# Patient Record
Sex: MALE | Race: WHITE
[De-identification: names, ages, dates, MRNs, and addresses within clinical notes are randomized per-mention and may not be internally consistent; named-entity substitution may affect disease eponyms.]

---

## 2018-02-02 ENCOUNTER — HOSPITAL ENCOUNTER (EMERGENCY)
Dept: HOSPITAL 17 - PHED | Age: 33
Discharge: HOME | End: 2018-02-02
Payer: SELF-PAY

## 2018-02-02 VITALS
HEART RATE: 86 BPM | SYSTOLIC BLOOD PRESSURE: 148 MMHG | DIASTOLIC BLOOD PRESSURE: 77 MMHG | RESPIRATION RATE: 14 BRPM | OXYGEN SATURATION: 96 %

## 2018-02-02 VITALS
TEMPERATURE: 98.7 F | SYSTOLIC BLOOD PRESSURE: 160 MMHG | HEART RATE: 84 BPM | RESPIRATION RATE: 16 BRPM | OXYGEN SATURATION: 98 % | DIASTOLIC BLOOD PRESSURE: 99 MMHG

## 2018-02-02 VITALS — RESPIRATION RATE: 14 BRPM

## 2018-02-02 VITALS
HEART RATE: 91 BPM | DIASTOLIC BLOOD PRESSURE: 79 MMHG | OXYGEN SATURATION: 100 % | RESPIRATION RATE: 20 BRPM | SYSTOLIC BLOOD PRESSURE: 123 MMHG

## 2018-02-02 VITALS — BODY MASS INDEX: 28.02 KG/M2 | WEIGHT: 189.16 LBS | HEIGHT: 69 IN

## 2018-02-02 DIAGNOSIS — M54.6: Primary | ICD-10-CM

## 2018-02-02 DIAGNOSIS — Z72.0: ICD-10-CM

## 2018-02-02 DIAGNOSIS — R10.12: ICD-10-CM

## 2018-02-02 LAB
ALBUMIN SERPL-MCNC: 4.2 GM/DL (ref 3.4–5)
ALP SERPL-CCNC: 87 U/L (ref 45–117)
ALT SERPL-CCNC: 58 U/L (ref 12–78)
AST SERPL-CCNC: 37 U/L (ref 15–37)
BASOPHILS # BLD AUTO: 0.4 TH/MM3 (ref 0–0.2)
BASOPHILS NFR BLD: 3.2 % (ref 0–2)
BILIRUB SERPL-MCNC: 0.3 MG/DL (ref 0.2–1)
BUN SERPL-MCNC: 12 MG/DL (ref 7–18)
CALCIUM SERPL-MCNC: 9.2 MG/DL (ref 8.5–10.1)
CHLORIDE SERPL-SCNC: 109 MEQ/L (ref 98–107)
CREAT SERPL-MCNC: 0.89 MG/DL (ref 0.6–1.3)
EOSINOPHIL # BLD: 0.1 TH/MM3 (ref 0–0.4)
EOSINOPHIL NFR BLD: 1.3 % (ref 0–4)
ERYTHROCYTE [DISTWIDTH] IN BLOOD BY AUTOMATED COUNT: 12.8 % (ref 11.6–17.2)
GFR SERPLBLD BASED ON 1.73 SQ M-ARVRAT: 98 ML/MIN (ref 89–?)
GLUCOSE SERPL-MCNC: 81 MG/DL (ref 74–106)
HCO3 BLD-SCNC: 24.7 MEQ/L (ref 21–32)
HCT VFR BLD CALC: 46.3 % (ref 39–51)
HGB BLD-MCNC: 15.9 GM/DL (ref 13–17)
LYMPHOCYTES # BLD AUTO: 2.9 TH/MM3 (ref 1–4.8)
LYMPHOCYTES NFR BLD AUTO: 25.5 % (ref 9–44)
MCH RBC QN AUTO: 31.4 PG (ref 27–34)
MCHC RBC AUTO-ENTMCNC: 34.4 % (ref 32–36)
MCV RBC AUTO: 91.2 FL (ref 80–100)
MONOCYTE #: 0.7 TH/MM3 (ref 0–0.9)
MONOCYTES NFR BLD: 6 % (ref 0–8)
NEUTROPHILS # BLD AUTO: 7.1 TH/MM3 (ref 1.8–7.7)
NEUTROPHILS NFR BLD AUTO: 64 % (ref 16–70)
PLATELET # BLD: 250 TH/MM3 (ref 150–450)
PMV BLD AUTO: 9.9 FL (ref 7–11)
PROT SERPL-MCNC: 7.9 GM/DL (ref 6.4–8.2)
RBC # BLD AUTO: 5.08 MIL/MM3 (ref 4.5–5.9)
SODIUM SERPL-SCNC: 140 MEQ/L (ref 136–145)
WBC # BLD AUTO: 11.2 TH/MM3 (ref 4–11)

## 2018-02-02 PROCEDURE — 96374 THER/PROPH/DIAG INJ IV PUSH: CPT

## 2018-02-02 PROCEDURE — 99284 EMERGENCY DEPT VISIT MOD MDM: CPT

## 2018-02-02 PROCEDURE — 83690 ASSAY OF LIPASE: CPT

## 2018-02-02 PROCEDURE — 80053 COMPREHEN METABOLIC PANEL: CPT

## 2018-02-02 PROCEDURE — 96375 TX/PRO/DX INJ NEW DRUG ADDON: CPT

## 2018-02-02 PROCEDURE — 85025 COMPLETE CBC W/AUTO DIFF WBC: CPT

## 2018-02-02 PROCEDURE — 96376 TX/PRO/DX INJ SAME DRUG ADON: CPT

## 2018-02-02 PROCEDURE — 72128 CT CHEST SPINE W/O DYE: CPT

## 2018-02-02 NOTE — RADRPT
EXAM DATE/TIME:  02/02/2018 19:45 

 

[facility] COMPARISON:     

No previous studies available for comparison.

 

 

INDICATIONS :     

Trauma.  Mid back pain. 

                      

 

RADIATION DOSE:     

43.41 CTDIvol (mGy) 

 

 

 

MEDICAL HISTORY :     

None  

 

SURGICAL HISTORY :      

None. 

 

ENCOUNTER:      

Initial

 

ACUITY:      

1 day

 

PAIN SCALE:      

10/10

 

LOCATION:         

Thoracic spine.

 

TECHNIQUE:     

Volumetric scanning of the thoracic spine was performed.  Multiplanar reconstructions in the sagittal
, coronal and oblique axial planes were performed.  Using automated exposure control and adjustment o
f the mA and/or kV according to patient size, radiation dose was kept as low as reasonably achievable
 to obtain optimal diagnostic quality images.   DICOM format image data is available electronically f
or review and comparison.  

 

FINDINGS:     

The vertebral bodies of the thoracic spine are in normal alignment without evidence of subluxation.  


Vertebral body height is maintained.  No fractures are seen. Mild degenerative changes are noted thro
ughout the thoracic spine. There is a tiny 2 mm calcified nonobstructing left renal calculus.

 

T1-T2:   

Normal.

 

T2-T3:   

The thecal sac has a normal diameter.  No evidence of disc bulge or protrusion.

 

T3-T4:   

The thecal sac has a normal diameter.  No evidence of disc bulge or protrusion.

 

T4-T5:   

The thecal sac has a normal diameter.  No evidence of disc bulge or protrusion.

 

T5-T6:   

The thecal sac has a normal diameter.  No evidence of disc bulge or protrusion.

 

T6-T7:   

The thecal sac has a normal diameter.  No evidence of disc bulge or protrusion.

 

T7-T8:   

The thecal sac has a normal diameter.  No evidence of disc bulge or protrusion.

 

T8-T9:   

The thecal sac has a normal diameter.  No evidence of disc bulge or protrusion.

 

T9-T10:  

The thecal sac has a normal diameter.  No evidence of disc bulge or protrusion.

 

T10-T11:  

The thecal sac has a normal diameter.  No evidence of disc bulge or protrusion.

 

T11-T12:  

The thecal sac has a normal diameter.  No evidence of disc bulge or protrusion.

 

T12-L1:  

The thecal sac has a normal diameter.  No evidence of disc bulge or protrusion.

 

CONCLUSION:     

1. No acute fracture or subluxation. 

2. Mild degenerative changes throughout the thoracic spine. 

3. Tiny 2 mm calcified nonobstructing left renal calculus. 

 

 

 Ariel Lovelace MD on February 02, 2018 at 20:12           

Board Certified Radiologist.

 This report was verified electronically.
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
## 2018-03-10 ENCOUNTER — HOSPITAL ENCOUNTER (EMERGENCY)
Dept: HOSPITAL 17 - PHEFT | Age: 33
Discharge: HOME | End: 2018-03-10
Payer: SELF-PAY

## 2018-03-10 VITALS — BODY MASS INDEX: 27.4 KG/M2 | WEIGHT: 184.97 LBS | HEIGHT: 69 IN

## 2018-03-10 VITALS
DIASTOLIC BLOOD PRESSURE: 74 MMHG | HEART RATE: 88 BPM | OXYGEN SATURATION: 96 % | RESPIRATION RATE: 16 BRPM | SYSTOLIC BLOOD PRESSURE: 149 MMHG | TEMPERATURE: 98.3 F

## 2018-03-10 DIAGNOSIS — Z72.0: ICD-10-CM

## 2018-03-10 DIAGNOSIS — L02.512: Primary | ICD-10-CM

## 2018-03-10 PROCEDURE — 96372 THER/PROPH/DIAG INJ SC/IM: CPT

## 2018-03-10 PROCEDURE — 87205 SMEAR GRAM STAIN: CPT

## 2018-03-10 PROCEDURE — 87070 CULTURE OTHR SPECIMN AEROBIC: CPT

## 2018-03-10 PROCEDURE — 10061 I&D ABSCESS COMP/MULTIPLE: CPT

## 2018-03-10 NOTE — PD
HPI


Chief Complaint:  Skin Problem


Time Seen by Provider:  11:49


Travel History


International Travel<30 days:  No


Contact w/Intl Traveler<30days:  No


Traveled to known affect area:  No





History of Present Illness


HPI


32-year-old male that presents to the ED for evaluation of possible infection 

to his left index finger.  Per patient she's had this for about a week and 

half.  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 

believes that he injured its.  Per patient he believes that he cut it with a 

piece of metal.  Per patient he was reddened pimple-like yesterday and he 

himself expressed the pus.  Per patient he was able to get some pulsatile but 

today became more severe and more painful.  Patient is concerned that there is 

an infection in there.  Per patient he is up-to-date with his tetanus.  No 

other medical issues.  No foreign body sensation.  Able to move it fully but 

has pain with flexion.  All the pain and swelling since to the dorsal aspect of 

the PIP area of the left index finger.  Some soft tissue swelling noted.  

Erythema noted.  Per patient the pain is 8 out of 10 especially with movement 

and touch.





PFSH


Past Medical History


Hx Anticoagulant Therapy:  No


Diabetes:  No





Past Surgical History


Abdominal Surgery:  Yes (HERNIA)





Social History


Alcohol Use:  Yes


Tobacco Use:  Yes


Substance Use:  No





Allergies-Medications


(Allergen,Severity, Reaction):  


Coded Allergies:  


     No Known Allergies (Unverified , 3/10/18)


Reported Meds & Prescriptions





Reported Meds & Active Scripts


Active


Bactrim DS (Sulfamethoxazole-Trimethoprim) 800-160 Mg Tab 1 Tab PO BID 10 Days


Clindamycin (Clindamycin HCl) 150 Mg Cap 300 Mg PO Q8HR 10 Days


Diclofenac Sodium DR (Diclofenac Sodium) 75 Mg Tabdr 75 Mg PO BID PRN








Review of Systems


Except as stated in HPI:  all other systems reviewed are Neg





Physical Exam


Narrative


GENERAL: 


SKIN: Warm and dry.


HEAD: Atraumatic. Normocephalic. 


EYES: Pupils equal and round. No scleral icterus. No injection or drainage. 


ENT: No nasal bleeding or discharge.  Mucous membranes pink and moist.


NECK: Trachea midline. No JVD. 


CARDIOVASCULAR: Regular rate and rhythm.  


RESPIRATORY: No accessory muscle use. Clear to auscultation. Breath sounds 

equal bilaterally. 


GASTROINTESTINAL: Abdomen soft, non-tender, nondistended. Hepatic and splenic 

margins not palpable. 


MUSCULOSKELETAL: Extremities without clubbing, cyanosis, or edema. No obvious 

deformities.  Full range of motion of the upper and lower extremities 

bilaterally.  Patient has full range of motion of the left index finger but has 

pain with flexion.  Unable to do it however.  Patient has soft tissue swelling 

as well as erythema on the dorsal aspect of the left PIP joint.  Joint itself 

appears to be intact.  Most of the swelling appears to be in the dorsal aspect.

  No obvious purulence felt.  Warm to touch and erythematous


NEUROLOGICAL: Awake and alert. No obvious cranial nerve deficits.  Motor 

grossly within normal limits. Five out of 5 muscle strength in the arms and 

legs.  Normal speech.


PSYCHIATRIC: Appropriate mood and affect; insight and judgment normal.





Data


Data


Last Documented VS





Vital Signs








  Date Time  Temp Pulse Resp B/P (MAP) Pulse Ox O2 Delivery O2 Flow Rate FiO2


 


3/10/18 11:37 98.3 88 16 149/74 (99) 96   








Orders





 Orders


Wound Culture And Gram Stain (3/10/18 11:51)


Wound Care (3/10/18 11:51)


Bupivacaine Pf 0.5% Inj (Marcaine Pf 0.5 (3/10/18 12:00)


Lidocaine 1% Inj (50 Ml) (Xylocaine 1% I (3/10/18 12:00)


Clindamycin Inj (Cleocin Inj) (3/10/18 12:00)


Lidocaine Pf 1% Inj (Xylocaine-Mpf 1% In (3/10/18 11:54)


Splint Or Brace Apply/Monitor (3/10/18 12:19)


Ed Discharge Order (3/10/18 12:20)








[facility]


Medical Decision Making


Medical Screen Exam Complete:  Yes


Emergency Medical Condition:  Yes


Medical Record Reviewed:  Yes


Differential Diagnosis


Abscesses versus pustule versus cellulitis


Narrative Course


32-year-old male that presents to the ED for evaluation of left index finger 

infection.  Patient was properly examined and was found to have signs and 

symptoms consistent appears to be a superficial abscess.  He appears to be 

injuring.  Patient wanted lanced again.  At this time I do recommend this.  

After explaining procedure to the patient and he agreed to it abscess was 

incised and drained as stated in procedure note.  Minimal to no pus was 

expressed.  Culture was taken however.  Patient will be started on clindamycin 

here.  Patient was given prescription for clindamycin and Bactrim.  Given 

prescription for Robaxin for pain.  Told to apply ice to the area.  Given wound 

care as well as splint.  Follow-up with PCP.  Recheck in 48 hours if not 

better.  See ED worsening symptoms.





Procedures


**Procedure Narrative**


After the risks and benefits were discussed the following procedure was 

performed:


INCISION AND DRAINAGE OF ABSCESS: The area was prepped and was sterilely 

draped.  A subcutaneous wheal of 1 % Xylocaine  with a total number 5 mL was 

used to anesthetize the area for digital block.  The area was properly 

anesthetized.  A number 11 scalpel was used to make a 0.5 -cm incision across 

the area of the abscess.  Cultures were obtained.  The abscess was drained an 

irrigated with normal saline.  Quarter inch iodoform packing  was placed in the 

wound. Sterile dressing applied. Patient advised to have packing removed in two 

days.





Diagnosis





 Primary Impression:  


 Finger infection


Patient Instructions:  General Instructions





***Additional Instructions:  


Take medications as prescribed.  Ice to the area.  Change dressings daily.  

Packing removal in 48 hours.  Recheck in 48 hours if no improvement all.  See 

ED worsening symptoms.


***Med/Other Pt SpecificInfo:  Prescription(s) given, Wound Care


Scripts


Sulfamethoxazole-Trimethoprim (Bactrim DS) 800-160 Mg Tab


1 TAB PO BID for Infection for 10 Days, #20 TAB 0 Refills


   Prov: Jose Ramon Bhardwaj MD         3/10/18 


Clindamycin (Clindamycin) 150 Mg Cap


300 MG PO Q8HR for Infection for 10 Days, CAP 0 Refills


   Prov: Jose Ramon Bhardwaj MD         3/10/18 


Diclofenac Sodium DR (Diclofenac Sodium DR) 75 Mg Tabdr


75 MG PO BID Y for PAIN SCALE 1 TO 10, #20 TAB 0 Refills


   Prov: Jose Ramon Bhardwaj MD         3/10/18


Disposition:  01 DISCHARGE HOME


Condition:  Stable











Cesar Guevara Mar 10, 2018 12:27